# Patient Record
Sex: FEMALE | URBAN - METROPOLITAN AREA
[De-identification: names, ages, dates, MRNs, and addresses within clinical notes are randomized per-mention and may not be internally consistent; named-entity substitution may affect disease eponyms.]

---

## 2022-04-28 ENCOUNTER — APPOINTMENT (RX ONLY)
Dept: URBAN - METROPOLITAN AREA CLINIC 154 | Facility: CLINIC | Age: 42
Setting detail: DERMATOLOGY
End: 2022-04-28

## 2022-04-28 DIAGNOSIS — L80 VITILIGO: ICD-10-CM

## 2022-04-28 DIAGNOSIS — L82.1 OTHER SEBORRHEIC KERATOSIS: ICD-10-CM

## 2022-04-28 PROCEDURE — ? PRESCRIPTION

## 2022-04-28 PROCEDURE — ? TREATMENT REGIMEN

## 2022-04-28 PROCEDURE — ? COUNSELING

## 2022-04-28 PROCEDURE — ? PHOTO-DOCUMENTATION

## 2022-04-28 PROCEDURE — 99203 OFFICE O/P NEW LOW 30 MIN: CPT

## 2022-04-28 RX ORDER — CLOBETASOL PROPIONATE 0.5 MG/G
CREAM TOPICAL
Qty: 60 | Refills: 3 | Status: ERX | COMMUNITY
Start: 2022-04-28

## 2022-04-28 RX ORDER — TACROLIMUS 1 MG/G
OINTMENT TOPICAL
Qty: 100 | Refills: 6 | Status: ERX | COMMUNITY
Start: 2022-04-28

## 2022-04-28 RX ADMIN — TACROLIMUS: 1 OINTMENT TOPICAL at 00:00

## 2022-04-28 RX ADMIN — CLOBETASOL PROPIONATE: 0.5 CREAM TOPICAL at 00:00

## 2022-04-28 ASSESSMENT — LOCATION SIMPLE DESCRIPTION DERM
LOCATION SIMPLE: RIGHT CHEEK
LOCATION SIMPLE: NOSE

## 2022-04-28 ASSESSMENT — LOCATION DETAILED DESCRIPTION DERM
LOCATION DETAILED: NASAL DORSUM
LOCATION DETAILED: RIGHT SUPERIOR LATERAL MALAR CHEEK
LOCATION DETAILED: RIGHT INFERIOR MEDIAL MALAR CHEEK
LOCATION DETAILED: RIGHT CENTRAL MALAR CHEEK
LOCATION DETAILED: RIGHT SUPERIOR MEDIAL BUCCAL CHEEK

## 2022-04-28 ASSESSMENT — LOCATION ZONE DERM
LOCATION ZONE: NOSE
LOCATION ZONE: FACE

## 2022-04-28 NOTE — PROCEDURE: TREATMENT REGIMEN
Initiate Treatment: Tacrolimus 0.1 % topical ointment — Apply to face once nightly 5 days a week (or M-F)\\n\\nClobetasol 0.05 % topical cream — Apply 2 days a week (or Sat and Sun) to the vitiligo spots
Detail Level: Zone

## 2022-06-08 LAB
ANION GAP SERPL CALC-SCNC: 13.2 MMOL/L (ref 8–16)
BASOPHILS # BLD AUTO: 0.1 10*3/UL (ref 0–0.1)
BASOPHILS NFR BLD AUTO: 0.6 % (ref 0–1)
BUN SERPL-MCNC: 9 MG/DL (ref 7–26)
BUN/CREAT SERPL: 12 (ref 6–25)
CALCIUM SERPL-MCNC: 8.8 MG/DL (ref 8.4–10.2)
CHLORIDE SERPL-SCNC: 104 MMOL/L (ref 98–107)
CO2 SERPL-SCNC: 27 MMOL/L (ref 22–29)
DEPRECATED NEUTROPHILS # BLD AUTO: 4 10*3/UL (ref 2.1–6.9)
EOSINOPHIL # BLD AUTO: 0.2 10*3/UL (ref 0–0.4)
EOSINOPHIL NFR BLD AUTO: 2.9 % (ref 0–6)
ERYTHROCYTE [DISTWIDTH] IN CORD BLOOD: 17.2 % (ref 11.7–14.4)
GLUCOSE SERPLBLD-MCNC: 96 MG/DL (ref 74–118)
HCT VFR BLD AUTO: 38.7 % (ref 34.2–44.1)
HGB BLD-MCNC: 11.5 G/DL (ref 12–16)
LYMPHOCYTES # BLD: 3 10*3/UL (ref 1–3.2)
LYMPHOCYTES NFR BLD AUTO: 37.9 % (ref 18–39.1)
MCH RBC QN AUTO: 25.4 PG (ref 28–32)
MCHC RBC AUTO-ENTMCNC: 29.7 G/DL (ref 31–35)
MCV RBC AUTO: 85.6 FL (ref 81–99)
MONOCYTES # BLD AUTO: 0.6 10*3/UL (ref 0.2–0.8)
MONOCYTES NFR BLD AUTO: 7.6 % (ref 4.4–11.3)
NEUTS SEG NFR BLD AUTO: 50.7 % (ref 38.7–80)
PLATELET # BLD AUTO: 362 X10E3/UL (ref 140–360)
POTASSIUM SERPL-SCNC: 4.2 MMOL/L (ref 3.5–5.1)
RBC # BLD AUTO: 4.52 X10E6/UL (ref 3.6–5.1)
SODIUM SERPL-SCNC: 140 MMOL/L (ref 136–145)

## 2022-06-10 ENCOUNTER — HOSPITAL ENCOUNTER (OUTPATIENT)
Dept: HOSPITAL 88 - OR | Age: 42
Discharge: HOME | End: 2022-06-10
Attending: PODIATRIST
Payer: COMMERCIAL

## 2022-06-10 VITALS — SYSTOLIC BLOOD PRESSURE: 105 MMHG | DIASTOLIC BLOOD PRESSURE: 75 MMHG

## 2022-06-10 DIAGNOSIS — E66.01: ICD-10-CM

## 2022-06-10 DIAGNOSIS — Z01.812: ICD-10-CM

## 2022-06-10 DIAGNOSIS — Z20.822: ICD-10-CM

## 2022-06-10 DIAGNOSIS — Z88.6: ICD-10-CM

## 2022-06-10 DIAGNOSIS — M20.11: Primary | ICD-10-CM

## 2022-06-10 DIAGNOSIS — Z01.818: ICD-10-CM

## 2022-06-10 DIAGNOSIS — M85.671: ICD-10-CM

## 2022-06-10 DIAGNOSIS — M19.071: ICD-10-CM

## 2022-06-10 DIAGNOSIS — Z01.810: ICD-10-CM

## 2022-06-10 PROCEDURE — 80048 BASIC METABOLIC PNL TOTAL CA: CPT

## 2022-06-10 PROCEDURE — 76000 FLUOROSCOPY <1 HR PHYS/QHP: CPT

## 2022-06-10 PROCEDURE — 85025 COMPLETE CBC W/AUTO DIFF WBC: CPT

## 2022-06-10 PROCEDURE — 71046 X-RAY EXAM CHEST 2 VIEWS: CPT

## 2022-06-10 PROCEDURE — 93005 ELECTROCARDIOGRAM TRACING: CPT

## 2022-06-10 PROCEDURE — 81025 URINE PREGNANCY TEST: CPT

## 2022-06-10 PROCEDURE — 36415 COLL VENOUS BLD VENIPUNCTURE: CPT

## 2022-06-10 PROCEDURE — 28296 COR HLX VLGS DSTL MTAR OSTEO: CPT
